# Patient Record
Sex: FEMALE | Race: WHITE | NOT HISPANIC OR LATINO | Employment: OTHER | URBAN - METROPOLITAN AREA
[De-identification: names, ages, dates, MRNs, and addresses within clinical notes are randomized per-mention and may not be internally consistent; named-entity substitution may affect disease eponyms.]

---

## 2018-07-27 ENCOUNTER — OFFICE VISIT (OUTPATIENT)
Dept: NEUROLOGY | Facility: CLINIC | Age: 68
End: 2018-07-27
Payer: MEDICARE

## 2018-07-27 VITALS
HEART RATE: 63 BPM | DIASTOLIC BLOOD PRESSURE: 82 MMHG | SYSTOLIC BLOOD PRESSURE: 141 MMHG | RESPIRATION RATE: 18 BRPM | WEIGHT: 136.5 LBS | HEIGHT: 63 IN | BODY MASS INDEX: 24.19 KG/M2

## 2018-07-27 DIAGNOSIS — G50.9 IDIOPATHIC TRIGEMINAL NEUROPATHY: Primary | ICD-10-CM

## 2018-07-27 PROBLEM — G50.1 ATYPICAL FACIAL PAIN: Status: ACTIVE | Noted: 2017-03-20

## 2018-07-27 PROBLEM — M47.817 LUMBOSACRAL SPONDYLOSIS WITHOUT MYELOPATHY: Status: ACTIVE | Noted: 2017-03-21

## 2018-07-27 PROBLEM — M79.644 PAIN OF RIGHT THUMB: Status: ACTIVE | Noted: 2018-06-19

## 2018-07-27 PROCEDURE — 99999 PR PBB SHADOW E&M-NEW PATIENT-LVL III: CPT | Mod: PBBFAC,,, | Performed by: PSYCHIATRY & NEUROLOGY

## 2018-07-27 PROCEDURE — 99204 OFFICE O/P NEW MOD 45 MIN: CPT | Mod: S$GLB,,, | Performed by: PSYCHIATRY & NEUROLOGY

## 2018-07-27 RX ORDER — HYDROCHLOROTHIAZIDE 12.5 MG/1
12.5 TABLET ORAL
COMMUNITY
Start: 2017-12-18 | End: 2018-12-18

## 2018-07-27 RX ORDER — GABAPENTIN 100 MG/1
CAPSULE ORAL
COMMUNITY
Start: 2018-07-11 | End: 2018-07-27

## 2018-07-27 RX ORDER — DICLOFENAC SODIUM 10 MG/G
2 GEL TOPICAL DAILY
COMMUNITY

## 2018-07-27 RX ORDER — PANTOPRAZOLE SODIUM 40 MG/1
40 TABLET, DELAYED RELEASE ORAL
COMMUNITY
Start: 2018-06-18

## 2018-07-27 RX ORDER — BACLOFEN 10 MG/1
10 TABLET ORAL 4 TIMES DAILY
COMMUNITY
Start: 2018-05-08 | End: 2019-05-08

## 2018-07-27 RX ORDER — ATORVASTATIN CALCIUM 10 MG/1
10 TABLET, FILM COATED ORAL
COMMUNITY
Start: 2018-06-18

## 2018-07-27 RX ORDER — FERROUS SULFATE 324(65)MG
325 TABLET, DELAYED RELEASE (ENTERIC COATED) ORAL EVERY OTHER DAY
COMMUNITY

## 2018-07-27 RX ORDER — HYDROCODONE BITARTRATE AND ACETAMINOPHEN 10; 325 MG/1; MG/1
TABLET ORAL
COMMUNITY

## 2018-07-27 RX ORDER — CHOLECALCIFEROL (VITAMIN D3) 125 MCG
CAPSULE ORAL
COMMUNITY

## 2018-07-27 RX ORDER — LANOLIN ALCOHOL/MO/W.PET/CERES
100 CREAM (GRAM) TOPICAL DAILY
COMMUNITY

## 2018-07-27 RX ORDER — LANOLIN ALCOHOL/MO/W.PET/CERES
400 CREAM (GRAM) TOPICAL DAILY
COMMUNITY

## 2018-07-27 RX ORDER — NORTRIPTYLINE HYDROCHLORIDE 75 MG/1
60 CAPSULE ORAL
COMMUNITY
Start: 2018-06-18 | End: 2019-06-18

## 2018-07-27 RX ORDER — FLUTICASONE PROPIONATE 50 MCG
1 SPRAY, SUSPENSION (ML) NASAL DAILY
COMMUNITY

## 2018-07-27 RX ORDER — RAMELTEON 8 MG/1
TABLET ORAL
COMMUNITY
Start: 2018-07-11 | End: 2019-04-11

## 2018-07-27 RX ORDER — GABAPENTIN 300 MG/1
300 CAPSULE ORAL 3 TIMES DAILY
Qty: 270 CAPSULE | Refills: 3 | Status: SHIPPED | OUTPATIENT
Start: 2018-07-27 | End: 2019-05-28 | Stop reason: SDUPTHER

## 2018-07-27 NOTE — PATIENT INSTRUCTIONS
Ask your doctor about your thyroid function to see if this is why you are getting hot sensations in your face and excessive sweating.      Will increase Gabapentin to 300mg three times a day for the facial numbness.  This can go higher if needed.  For now, increase as instructed below.    Week AM Midday PM   1 200 200 300   2 300 200 300   3 300 300 300     Common side effects are fatigue and lightheadedness.  These should pass after a few days.    Please contact the office with any questions.

## 2018-07-27 NOTE — PROGRESS NOTES
NEUROLOGY  Outpatient CONSULT    [facility]  [address], [place] [address]  [phone] (office) / [phone] (fax)    Patient Name:  Elda Cohen  :  1950  MR #:  [id number]  Acct #:  [id number]    Date of Neurology Consult: 2018  Name of Neurologist: Tasha Reilly D.O, ABPN, AOBNP, ABEM    Other Physicians:  Amanda Orantes MD (Primary Care Physician); Amanda Orantes MD (Referring)      Chief Complaint: Numbness (consult)      History of Present Illness (HPI):  Elda Cohen is a 67 y.o. female referred by her primary physician for consultation regarding right facial numbness.    Patient states that this comes and goes.  She has feels it from the ear to her jaw and down her neck.  Sometimes she will get a sharp pain behind her right ear.  She states it feels like a 1000 needles on her face.  She will rub it or tilt her head and pull it, but it will persist.  It is driving her crazy.  She feels like this has to be a sign of something.  She is grateful it is not continuous.  She has not had any trauma to that side of the face.  She has no issues with the ear.  She has never had ear surgery.  This started about a year ago.  It was sudden onset.  She states initially it was just the jaw line, but now she feels it in the ear and neck.  It has not extended up the face.        She cannot say that anything triggers the numbness.  There is nothing that makes it go away.  She rubs it and tries exercises, but it will usually just spontaneous go away.  It is not painful.  It does not affect her chewing, speaking or swallowing.    She was seen by the Dentist who did not see anything abnormal.     She was seen by Dr. Ang in [place].  She was put on Gabapentin.  She does not feel it is helping.  She is on 200mg TID.  She states she did not really know what it was supposed to do.  She does not feel it is suppressing these sensations.  She has not tried going  higher.    She states while sitting here in the office she feels like her whole face is getting hot and red.  She states it can't be a hot flash.    Treatment to date:    Gabapentin 200mg TID    Review of Systems:   General: Weight gain: No, Weight Loss: No, Fatigue: No,   Fever: No, Chills: No, Night Sweats: No, Insomnia: Yes, Excessive sleeping: No   Respiratory:  Cough: No, Shortness of Breath: No,   Wheezing: No, Excessive Snoring: Yes, Coughing up blood: No  Endocrine: Heat Intolerance: Yes, Cold Intolerance: No,   Excessive Thirst: No, Excessive Hunger: No,   Eyes:  Blurred Vision: No, Double Vision: No,   Light Sensitivity: No, Eye pain: Yes  Musculoskeletal: Muscle Aches/Pain: Yes, Joint Pain/Swelling: Yes, Muscle Cramps: No, Muscle Weakness: No, Neck Pain: Yes, Back Pain: Yes   Neurological: Difficulty Walking/Falls: Yes, Headache Migraine: No, Dizziness/Vertigo: No, Fainting: No, Difficulty with Speech: No, Weakness: No, Tingling/Numbness: Yes, Tremors: No, Memory Problems: Yes, Seizures: No, Difficulty Swallowing: No, Altered Taste: No.  Cardiovascular: Chest Pain: No, Shortness of Breath: No,   Palpitations: No,  Gastrointestinal: Nausea/Vomiting: No, Constipation: Yes, Diarrhea: No, Bloody Stools: No   Psych/Cog:  Depression: No, Anxiety: No, Hallucinations: No, Problems Concentrating: No  : Frequent Urination: No, Incontinence: No, Blood of Urine: No, Urinary Infections: No, Changes in Sex Drive: No   ENT:Hearing Loss: Yes, Earache: No, Ringing in Ears: No,   Facial Pain: Yes, Chronic Congestion: No   Immune: Seasonal Allergies: Yes, Hives and/or Rashes: No  The remainder of the review of twelve body systems was reviewed and normal.    Past Medical, Surgical, Family & Social History:   Past Medical History:   Diagnosis Date    Hypertension      Past Surgical History:   Procedure Laterality Date    GASTRIC BYPASS      knee replacemnet Right     NECK SURGERY      ROTATOR CUFF REPAIR       Family  "History   Problem Relation Age of Onset    Stroke Mother     Kidney disease Mother     Cancer Brother     Cancer Paternal Grandmother     Lung disease Brother      Alcohol use:  reports that she does not drink alcohol.   (Of note, 0.6 oz = 1 beer or 6 oz = 10 beers).  Tobacco use:  reports that she has never smoked. She has never used smokeless tobacco.  Street drug use:  reports that she does not use drugs.  Allergies: Patient has no known allergies..    Home Medications:     Current Outpatient Prescriptions:     atorvastatin (LIPITOR) 10 MG tablet, Take 10 mg by mouth., Disp: , Rfl:     baclofen (LIORESAL) 10 MG tablet, Take 10 mg by mouth., Disp: , Rfl:     gabapentin (NEURONTIN) 100 MG capsule, TAKE 2 CAPSULES BY MOUTH THREE TIMES DAILY, Disp: , Rfl:     hydroCHLOROthiazide (HYDRODIURIL) 12.5 MG Tab, Take 12.5 mg by mouth., Disp: , Rfl:     nortriptyline (PAMELOR) 75 MG Cap, Take 75 mg by mouth., Disp: , Rfl:     pantoprazole (PROTONIX) 40 MG tablet, Take 40 mg by mouth., Disp: , Rfl:     ramelteon (ROZEREM) 8 mg tablet, TAKE 1 TABLET BY MOUTH EVERY NIGHT AT BEDTIME AS NEEDED FOR SLEEP, Disp: , Rfl:     HYDROcodone-acetaminophen (NORCO)  mg per tablet, Norco, Disp: , Rfl:     Physical Examination:  BP (!) 141/82   Pulse 63   Resp 18   Ht 5' 3" (1.6 m)   Wt 61.9 kg (136 lb 8 oz)   BMI 24.18 kg/m²     GENERAL:  General appearance: Well, non-toxic appearing.  No apparent distress.  Fundi exam: normal.  Neck: supple.  Carotid auscultation: normal.  Heart auscultation: normal.  Peripheral pulses: normal.  Extremities: normal.    MENTAL STATUS:  Alertness, attention span & concentration: normal.  Language: normal.  Orientation to self, place & time:  normal.  Memory, recent & remote: normal.  Fund of knowledge: normal.    SPEECH:  Clear and fluent.  Follows complex commands.    CRANIAL NERVES:  Cranial Nerves II-XII were examined.  II - Visual fields: normal.  III, IV, VI: PERRL, EOMI, No " ptosis, No nystagmus.  V - Facial sensation: normal.  VII - Face symmetry & mobility: normal.  VIII - Hearing: normal.  IX, X - Palate: mobile & midline.  XI - Shoulder shrug: normal.  XII - Tongue protrusion: normal.    GROSS MOTOR:  Gait & station: normal.  Tone: normal.  Abnormal movements: none.  Finger-nose & Heel-knee-shin: normal.  Rapid alternating movements & drift: normal.  Romberg: absent    MUSCLE STRENGTH:     Fascics Atrophy RIGHT    LEFT Atrophy Fascics     5 Neck Ext. 5       5 Neck Flex 5       5 Deltoids 5       5 Sh.Ext.Rot. 5       5 Sh.Int.Rot. 5       5 Biceps 5       5 Triceps 5       5 Forearm.Pr. 5       5 Wrist Ext. 5       5 Wrist Flex 5       5 Finger Ext. 5       5 Finger Flex 5       5 FPL 5       5 Inteross. 5                         5 Iliopsoas 5       5 Hip Abduct 5       5 Hip Adduct 5       5 Quads 5       5 Hams 5       5 Dorsiflex 5       5 Plantar Flex 5       5 Ankle Dean 5       5 Ankle Invert 5       5 Toe Ext. 5       5 Toe Flex 5                         REFLEXES:    RIGHT Reflex   LEFT   2+ Biceps 2+   2+ Brachiorad. 2+   2+ Triceps 2+        2+ Patellar 2+   2+ Ankle 2+        Down PLANTAR Down     SENSORY:  Light touch: Normal throughout.  Sharp touch: Normal throughout.  Vibration: Normal throughout.    Diagnostic Data Reviewed:   Records were reviewed.  She was seen by her PCP in June complaining of right sided facial numbness and tingling.  She had been seen by neurologist her report she did not leak seen him.  She continued to have tingling in the right side of her face and so an MRI was performed which was normal. She was upset because she was told she would have to live with it.  Urine to 100 mg 3 times a day does not seem to be helping.  Her sensations continued occur multiple times per day.  She is on Pamelor but for other reasons.      Her images were reviewed.  There was no abnormality along the trigeminal nerve pathway such as vascular compression or mass  lesion.  See report below.    MRI ORBIT, FACE AND NECK WITH AND WITHOUT CONTRAST    HISTORY: Tingling right side of neck, face, ear, jaw.    TECHNIQUE: On the 1.5 Julia Siemens magnet, sagittal T1 VIKTOR, axial T1 VIKTOR, axial T2 VIKTOR, cor T1 VIKTOR, cor T1 FS, cor T2 FS, axial T1 VIBE, +c axial T1 VIBE, +c cor T1 FS, +c sagittal T1 FS were obtained.  15 cc MultiHance.    FINDINGS:  The portions of the brain seen appear unremarkable. The naso and oropharyngeal regions are normal. The portions of the sinuses seen appear normal. The parapharyngeal spaces and  spaces are normal. The oral cavity is grossly   unremarkable. The hypopharynx, glottic and subglottic regions are normal. The carotid and parotid spaces are normal. The infrahyoid neck is unremarkable.    The region of Meckel's cave shows no significant abnormality. I am unable to demonstrate an abnormality in the region of the trigeminal nerve or its branches. No areas of abnormal enhancement are seen after contrast material.    IMPRESSION:   No significant findings are demonstrated on this exam. Specifically, the trigeminal nerves and visualized branches appear normal.    Preliminary by Anam Robison MD. 3/30/2017 2:59 PM    Finalized by Anam Robison MD. 3/30/2017 4:15 PM   Other Result Information   Devyn, External Ris In - 03/30/2017  4:18 PM CDT  MRI ORBIT, FACE AND NECK WITH AND WITHOUT CONTRAST    HISTORY: Tingling right side of neck, face, ear, jaw.    TECHNIQUE: On the 1.5 Julia Siemens magnet, sagittal T1 VIKTOR, axial T1 VIKTOR, axial T2 VIKTOR, cor T1 VIKTOR, cor T1 FS, cor T2 FS, axial T1 VIBE, +c axial T1 VIBE, +c cor T1 FS, +c sagittal T1 FS were obtained.  15 cc MultiHance.    FINDINGS:  The portions of the brain seen appear unremarkable. The naso and oropharyngeal regions are normal. The portions of the sinuses seen appear normal. The parapharyngeal spaces and  spaces are normal. The oral cavity is grossly   unremarkable. The hypopharynx,  glottic and subglottic regions are normal. The carotid and parotid spaces are normal. The infrahyoid neck is unremarkable.    The region of Meckel's cave shows no significant abnormality. I am unable to demonstrate an abnormality in the region of the trigeminal nerve or its branches. No areas of abnormal enhancement are seen after contrast material.    IMPRESSION:   No significant findings are demonstrated on this exam. Specifically, the trigeminal nerves and visualized branches appear normal.       Her CMP from 6/18/18 was normal.    Assessment and Plan:  Elda Cohen is a 67 y.o., right handed woman who presents with complaints of right facial dysesthesias that have been persistent despite initiation of Gabapentin.  Her description is most consistent with trigeminal neuralgia.  We reviewed that there is no cure for trigeminal neuralgia and that the cause is poorly understood.  In patients with normal objective sensation, there is little need for imaging as this is not due to a compression or infarction of the nerve.  She had imaging which confirms there is no disruption of the trigeminal pathway. We reviewed that the mainstay of care is pain management.  We reviewed that there are several medication options including Tegretol, Trileptal, Gabapentin, Lyrica, and Amitriptyline.  We reviewed that she is on a very low dose of Gabapentin for someone with trigeminal neuralgia.  We reviewed the potential side effects of all of these medications.  She has agreed to try increasing the dose of Gabapentin as needed and as tolerated.  She will start with a transition to 300mg TID.  From there she will report in how she is doing and her prescription can be changed to account for higher dosing if needed.  From there she can follow up with her family physician unless further assistance is needed in the future.    Important to note, also  has a past medical history of Hypertension.        Tasha Reilly D.O, ABPN, AOBNP,  ABEM    This note was created with voice recognition software.  Grammatical, syntax and spelling errors may be inevitable.

## 2018-07-27 NOTE — LETTER
August 22, 2018      Amanda Orantes MD  [address] [address]  [address]  [facility] [place] [address]           [facility]  [address] [facility] [address] [place] [address]  Phone: [phone]  Fax: [phone]          Patient: Elda Cohen   MR Number: [id number]   YOB: 1950   Date of Visit: 7/27/2018       Dear Dr. Amanda Orantes:    Thank you for referring Elda Cohen to me for evaluation. Attached you will find relevant portions of my assessment and plan of care.    If you have questions, please do not hesitate to call me. I look forward to following Elda Cohen along with you.    Sincerely,    Tasha Reilly,     Enclosure  CC:  No Recipients    If you would like to receive this communication electronically, please contact [email or web address] or [phone] to request more information on [employer] Link access.    For providers and/or their staff who would like to refer a patient to [facility], please contact us through our one-stop-shop provider referral line, [facility] , at [phone].    If you feel you have received this communication in error or would no longer like to receive these types of communications, please e-mail [email or web address]

## 2019-04-11 ENCOUNTER — OFFICE VISIT (OUTPATIENT)
Dept: NEUROLOGY | Facility: CLINIC | Age: 69
End: 2019-04-11
Payer: MEDICARE

## 2019-04-11 VITALS
RESPIRATION RATE: 18 BRPM | DIASTOLIC BLOOD PRESSURE: 71 MMHG | SYSTOLIC BLOOD PRESSURE: 156 MMHG | HEART RATE: 88 BPM | WEIGHT: 139.38 LBS | BODY MASS INDEX: 24.7 KG/M2 | HEIGHT: 63 IN

## 2019-04-11 DIAGNOSIS — G50.0 TRIGEMINAL NEURALGIA: ICD-10-CM

## 2019-04-11 DIAGNOSIS — M47.22 CERVICAL RADICULOPATHY DUE TO DEGENERATIVE JOINT DISEASE OF SPINE: Primary | ICD-10-CM

## 2019-04-11 DIAGNOSIS — M54.16 LUMBAR RADICULOPATHY: ICD-10-CM

## 2019-04-11 PROCEDURE — 99215 OFFICE O/P EST HI 40 MIN: CPT | Mod: S$GLB,,, | Performed by: PSYCHIATRY & NEUROLOGY

## 2019-04-11 PROCEDURE — 1101F PR PT FALLS ASSESS DOC 0-1 FALLS W/OUT INJ PAST YR: ICD-10-PCS | Mod: CPTII,S$GLB,, | Performed by: PSYCHIATRY & NEUROLOGY

## 2019-04-11 PROCEDURE — 99999 PR PBB SHADOW E&M-EST. PATIENT-LVL IV: CPT | Mod: PBBFAC,,, | Performed by: PSYCHIATRY & NEUROLOGY

## 2019-04-11 PROCEDURE — 99215 PR OFFICE/OUTPT VISIT, EST, LEVL V, 40-54 MIN: ICD-10-PCS | Mod: S$GLB,,, | Performed by: PSYCHIATRY & NEUROLOGY

## 2019-04-11 PROCEDURE — 1101F PT FALLS ASSESS-DOCD LE1/YR: CPT | Mod: CPTII,S$GLB,, | Performed by: PSYCHIATRY & NEUROLOGY

## 2019-04-11 PROCEDURE — 99999 PR PBB SHADOW E&M-EST. PATIENT-LVL IV: ICD-10-PCS | Mod: PBBFAC,,, | Performed by: PSYCHIATRY & NEUROLOGY

## 2019-04-11 RX ORDER — VIT C/E/ZN/COPPR/LUTEIN/ZEAXAN 250MG-90MG
CAPSULE ORAL
COMMUNITY
Start: 2019-03-04

## 2019-04-11 RX ORDER — HYDROCHLOROTHIAZIDE 12.5 MG/1
12.5 TABLET ORAL
COMMUNITY
Start: 2018-12-18 | End: 2019-12-18

## 2019-04-11 NOTE — PATIENT INSTRUCTIONS
Will get an updated MRI of the cervical and lumbar spine.    A referral has been placed to the back and spine clinic for consultation regarding your radicular symptoms.

## 2019-04-11 NOTE — PROGRESS NOTES
NEUROLOGY  Outpatient Follow Up    [facility]  [address], [place] [address]  [phone] (office) / [phone] (fax)    Patient Name:  Elda Cohen  :  1950  MR #:  [id number]  Acct #:  [id number]    Date of Neurology Visit: 2019  Name of Neurologist: Tasha Reilly D.O, ABPN, AOBNP, ABEM    Other Physicians:  Amanda Orantes MD (Primary Care Physician); No ref. provider found (Referring)      Chief Complaint: Numbness (numbness/tingling to right occipital area radiating into scalp and right face. )      History of Present Illness (HPI):  Elda Cohen is a 68 y.o. femalewho states she is here for a new problem of numbness and tingling on the back of her neck and head.  She states she had these problems that last visit, but was focused on the trigeminal issue then.  She is here today because she wants to transition her neurological care from [facility] to [facility].    Patient states she has pinched nerves from C7-T1.  She has been given options of surgery versus medication management.  She has been getting a numbness and tingling sensation on the back of her neck, up the back or her head and to her ear.  She states that if she leans her head in the opposite direction it relieves some of those symptoms.      She changed her Gabapentin last year 300mg TID for trigeminal neuralgia.  This may have helped some, but it she still has painful episodes.  She is also on nortriptyline for her cervical spine disease.    She has had C3-4 surgery 25 years ago.  This was for numbness and weakness of her right arm.  She used to have to hold her right arm up and over her head to relieve the symptoms.  Surgery resolved these issues for years until about 2-3 years ago when she started getting this current numbness on the neck and head.    She also has lumbar spine disease.  She has had lumbar spine surgery on the right which did relieve her sciatic symptoms, but she still has  back pain.  This pain is mainly on the left side, and it does extend into her buttock.  She no longer has sciatic pain with it.  She tries not to limit her activities, but staying in certain positions will make it worse.  She tries to go about her day whether in pain or not.    She has had epidural injections in the past, she did not find them to be helpful for her back.  She states years ago the injections in her neck were helpful, but she still needed oral medication for this.    She has been ambulating hitched forward at the waist for relief of her back and buttock and leg pain.  She can't walk as far when she straightens up.  She wondered if she needed a back brace.      She has back and neck traction at home, but has not been using it lately due to concerns over her new symptoms.    She has a history of scoliosis of the spine as well.    Treatment to date:    Nortriptyline  Gabapentin    Review of Systems:    General: Weight gain: No, Weight Loss: No, Fatigue: No,   Fever: No, Chills: No, Night Sweats: No, Insomnia: No, Excessive sleeping: No   Respiratory:  Cough: No, Shortness of Breath: No,   Wheezing: No, Excessive Snoring: Yes, Coughing up blood: No  Endocrine: Heat Intolerance: Yes, Cold Intolerance: No,   Excessive Thirst: No, Excessive Hunger: No,   Eyes:  Blurred Vision: No, Double Vision: No,   Light Sensitivity: No, Eye pain: No  Musculoskeletal: Muscle Aches/Pain: Yes, Joint Pain/Swelling: Yes, Muscle Cramps: Yes, Muscle Weakness: Yes, Neck Pain: Yes, Back Pain: Yes   Neurological: Difficulty Walking/Falls: No, Headache Migraine: No, Dizziness/Vertigo: No, Fainting: No, Difficulty with Speech: No, Weakness: No, Tingling/Numbness: Yes, Tremors: No, Memory Problems: Yes, Seizures: No, Difficulty Swallowing: No, Altered Taste: No.  Cardiovascular: Chest Pain: No, Shortness of Breath: No,   Palpitations: No,  Gastrointestinal: Nausea/Vomiting: No, Constipation: Yes, Diarrhea: No, Bloody Stools: No    Psych/Cog:  Depression: No, Anxiety: No, Hallucinations: No, Problems Concentrating: No  : Frequent Urination: No, Incontinence: No, Blood of Urine: No, Urinary Infections: No, Changes in Sex Drive: No   ENT:Hearing Loss: Yes, Earache: No, Ringing in Ears: Yes,   Facial Pain: Yes, Chronic Congestion: No   Immune: Seasonal Allergies: Yes, Hives and/or Rashes: No  The remainder of the review of twelve body systems was reviewed and normal.    Past Medical, Surgical, Family & Social History:   Reviewed and updated.    Home Medications:     Current Outpatient Medications:     ascorbic acid, vitamin C, Powd, Take by mouth., Disp: , Rfl:     atorvastatin (LIPITOR) 10 MG tablet, Take 10 mg by mouth., Disp: , Rfl:     baclofen (LIORESAL) 10 MG tablet, Take 10 mg by mouth 4 (four) times daily. , Disp: , Rfl:     biotin 5,000 mcg TbDL, Take by mouth., Disp: , Rfl:     cholecalciferol, vitamin D3, (VITAMIN D3) 1,000 unit capsule, TAKE 1 CAPSULE BY MOUTH DAILY, Disp: , Rfl:     cyanocobalamin (VITAMIN B-12) 1000 MCG tablet, Take 100 mcg by mouth once daily., Disp: , Rfl:     diclofenac sodium 1 % Gel, Apply 2 g topically once daily., Disp: , Rfl:     ferrous sulfate 324 mg (65 mg iron) TbEC, Take 325 mg by mouth every other day. , Disp: , Rfl:     fluticasone (FLONASE) 50 mcg/actuation nasal spray, 1 spray by Each Nare route once daily., Disp: , Rfl:     gabapentin (NEURONTIN) 300 MG capsule, Take 1 capsule (300 mg total) by mouth 3 (three) times daily., Disp: 270 capsule, Rfl: 3    hydroCHLOROthiazide (HYDRODIURIL) 12.5 MG Tab, Take 12.5 mg by mouth., Disp: , Rfl:     HYDROcodone-acetaminophen (NORCO)  mg per tablet, Norco, Disp: , Rfl:     loratadine-pseudoephedrine  mg (CLARITIN-D 24 HOUR)  mg per 24 hr tablet, Take 1 tablet by mouth., Disp: , Rfl:     magnesium oxide (MAG-OX) 400 mg tablet, Take 400 mg by mouth once daily., Disp: , Rfl:     naloxegol (MOVANTIK) tablet, Take 25 mg by mouth  "3 (three) times a week., Disp: , Rfl:     nortriptyline (PAMELOR) 75 MG Cap, Take 60 mg by mouth. , Disp: , Rfl:     pantoprazole (PROTONIX) 40 MG tablet, Take 40 mg by mouth., Disp: , Rfl:     vitamins  A,C,E-zinc-copper (ICAPS AREDS) 14,320-226-200 unit-mg-unit Cap, Take by mouth. 2 capsules daily, Disp: , Rfl:     Physical Examination:  BP (!) 156/71   Pulse 88   Resp 18   Ht 5' 3" (1.6 m)   Wt 63.2 kg (139 lb 6.4 oz)   BMI 24.69 kg/m²     GENERAL:  General appearance: Well, non-toxic appearing.  No apparent distress.  Extremities: normal.    MENTAL STATUS:  Alertness, attention span & concentration: normal.  Language: normal.  Orientation to self, place & time:  normal.  Memory, recent & remote: normal.  Fund of knowledge: normal.     SPEECH:  Clear and fluent.  Follows complex commands.     CRANIAL NERVES:  Cranial Nerves II-XII were examined.  II - Visual fields: normal.  III, IV, VI: PERRL, EOMI, No ptosis, No nystagmus.  V - Facial sensation: normal.  VII - Face symmetry & mobility: normal.  VIII - Hearing: normal.  IX, X - Palate: mobile & midline.  XI - Shoulder shrug: normal.  XII - Tongue protrusion: normal.     GROSS MOTOR:  Gait & station: normal.  Tone: normal.  Abnormal movements: none.  Finger-nose & Heel-knee-shin: normal.  Rapid alternating movements & drift: normal.  Romberg: absent     MUSCLE STRENGTH:      Fascics Atrophy RIGHT      LEFT Atrophy Fascics       5 Neck Ext. 5           5 Neck Flex 5           5 Deltoids 5           5 Sh.Ext.Rot. 5           5 Sh.Int.Rot. 5           5 Biceps 5           5 Triceps 5           5 Forearm.Pr. 5           5 Wrist Ext. 5           5 Wrist Flex 5           5 Finger Ext. 5           5 Finger Flex 5           5 FPL 5           5 Inteross. 5                                           5 Iliopsoas 5           5 Hip Abduct 5           5 Hip Adduct 5           5 Quads 5           5 Hams 5           5 Dorsiflex 5           5 Plantar Flex 5           5 " Ankle Dean 5           5 Ankle Invert 5           5 Toe Ext. 5           5 Toe Flex 5                                          REFLEXES:     RIGHT Reflex    LEFT   2 Biceps 2   2 Brachiorad. 2   2 Triceps 2         2 Patellar 2   2 Ankle 2           Down PLANTAR Down      SENSORY:  Light touch: Normal throughout.  Sharp touch: Normal throughout.     Diagnostic Data Reviewed:   Records were reviewed.  She was seen by her PCP in June complaining of right sided facial numbness and tingling.  She had been seen by neurologist her report she did not leak seen him.  She continued to have tingling in the right side of her face and so an MRI was performed which was normal. She was upset because she was told she would have to live with it.  Urine to 100 mg 3 times a day does not seem to be helping.  Her sensations continued occur multiple times per day.  She is on Pamelor but for other reasons.       Her images were reviewed.  There was no abnormality along the trigeminal nerve pathway such as vascular compression or mass lesion.  See report below.       MRI ORBIT, FACE AND NECK WITH AND WITHOUT CONTRAST    HISTORY: Tingling right side of neck, face, ear, jaw.    TECHNIQUE: On the 1.5 Julia Siemens magnet, sagittal T1 VIKTOR, axial T1 VIKTOR, axial T2 VIKTOR, cor T1 VIKTOR, cor T1 FS, cor T2 FS, axial T1 VIBE, +c axial T1 VIBE, +c cor T1 FS, +c sagittal T1 FS were obtained.  15 cc MultiHance.    FINDINGS:  The portions of the brain seen appear unremarkable. The naso and oropharyngeal regions are normal. The portions of the sinuses seen appear normal. The parapharyngeal spaces and  spaces are normal. The oral cavity is grossly   unremarkable. The hypopharynx, glottic and subglottic regions are normal. The carotid and parotid spaces are normal. The infrahyoid neck is unremarkable.    The region of Meckel's cave shows no significant abnormality. I am unable to demonstrate an abnormality in the region of the trigeminal nerve or  its branches. No areas of abnormal enhancement are seen after contrast material.    IMPRESSION:  No significant findings are demonstrated on this exam. Specifically, the trigeminal nerves and visualized branches appear normal.    Preliminary by Anam Robison MD. 3/30/2017 2:59 PM    Finalized by Anam Robison MD. 3/30/2017 4:15 PM   Other Result Information   Devyn, External Ris In - 03/30/2017  4:18 PM CDT  MRI ORBIT, FACE AND NECK WITH AND WITHOUT CONTRAST    HISTORY: Tingling right side of neck, face, ear, jaw.    TECHNIQUE: On the 1.5 Julia Siemens magnet, sagittal T1 VIKTOR, axial T1 VIKTOR, axial T2 VIKTOR, cor T1 VIKTOR, cor T1 FS, cor T2 FS, axial T1 VIBE, +c axial T1 VIBE, +c cor T1 FS, +c sagittal T1 FS were obtained.  15 cc MultiHance.    FINDINGS:  The portions of the brain seen appear unremarkable. The naso and oropharyngeal regions are normal. The portions of the sinuses seen appear normal. The parapharyngeal spaces and  spaces are normal. The oral cavity is grossly   unremarkable. The hypopharynx, glottic and subglottic regions are normal. The carotid and parotid spaces are normal. The infrahyoid neck is unremarkable.    The region of Meckel's cave shows no significant abnormality. I am unable to demonstrate an abnormality in the region of the trigeminal nerve or its branches. No areas of abnormal enhancement are seen after contrast material.    IMPRESSION:   No significant findings are demonstrated on this exam. Specifically, the trigeminal nerves and visualized branches appear normal.         Her CMP from 6/18/18 was normal.  Her TSH from Dec 2018 was normal.      Lumbar spine 5 view with previous study 2016 from [place]  ASSESSMENT: Again noted is the scoliosis. Osteopenia. Multilevel degenerative change throughout with disc space narrowing and marginal osteophytes with particular discogenic endplate reactive changes at L1-L2. No fracture identified. Extensive facet    osteoarthropathy.  IMPRESSION:   Progressive degenerative changes from 2016 and scoliosis.      MRI scan lumbar spine without contrast at [facility]  66-year-old patient with history of acute onset of left-sided back pain, sciatica for 4 weeks.  On the 1.5 Julia Siemens magnet, sagittal T2, sagittal T1, axial T2, axial T1, sagittal STIR were obtained.  Advanced Modic endplate changes at L1-2.  Mild anterolisthesis at L3-4, L3 anterior to L4 x 2 mm.  Schmorl's formation endplate disease at T11-12, worse anteriorly.  Conus medullaris terminates at L1 vertebral body level.  Rotoscoliosis, superiorly convexity to the right, inferiorly to the left.  T12-L1, left facet and ligamentum flavum hypertrophy, posterior mild leftward disc bulge and anterior leftward disc protrusion, osteophyte projection.  L1-2 asymmetric, left greater than right, facet and ligamentum flavum hypertrophy, left lateral osteophytic projection, rightward disc protrusion, compression of the left exiting and potentially the descending nerve root, mild focal spinal stenosis.  L2-3, moderate facet and ligamentum flavum hypertrophy, greater on the left effacement of the posterior lateral thecal sac, lateral disc bulge, greater to the right, no nerve root compression.  L3-4, posterior broad-based rightward disc bulge, bilateral facet hypertrophy, partial laminectomy and facetectomy, spinectomy, potential impingement of the right exiting nerve root, mild focal spinal stenosis.  L4-5, asymmetric, right greater than left facet and ligamentum flavum hypertrophy, posterior broad-based rightward disc bulge, potential for impingement of the right descending and exiting nerve roots, mild focal spinal stenosis.  L5-S1, posterior rightward disc bulge, facet hypertrophy, contact with the right exiting nerve root, potential for impingement, no compression.  Retroperitoneum, partially and suboptimally visualized, no obvious structural  lesions.  IMPRESSION:  MRI scan lumbar spine without contrast demonstrating  Rotoscoliosis with associated spondylosis and associated disc and endplate disease, worse at L1-2, as detailed above.  Postsurgical changes at L3-4, as detailed above.  No previous MRI available for comparison.      Assessment and Plan:  Elda Cohen is a 67 y.o., right handed woman seen last year for complaints of right facial dysesthesias that have been persistent despite initiation of low dose Gabapentin.  Her description was most consistent with trigeminal neuralgia. Increasing Gabapentin helped the frequency but not the pain.  Today she is worried about other issues. She has an extensive history of cervical and lumbar spine degenerative joint disease with radicular symptoms.  She has had surgery as well as epidural injections.  Surgery was beneficial for her for years, but more recently she has been experiencing new symptoms.  She has left low back and buttock pain which could be from S1 radicular disease.  She has right posterior neck, head and ear paresthesias frequently seen with C2/3 radiculopathy.    Will get updated MRIs of her cervical and lumbar spine.  Will refer to the back and spine clinic for assistance.  Will request discs of imaging from [facility] and from [facility] ([facility]) from the last 10 years including reports for the cervical, thoracic and lumbar spine.  Please also include any operative reports of the cervical and lumbar spine.    She will continue to avoid using her traction devices without updated advisement from the back and spine clinic.  She can continue her current medications for now, though the clinic may alter them somewhat to suit the needs of her new spinal issues.      Important to note, also  has a past medical history of Chronic pain, Hyperlipidemia, Hypertension, and Neuropathy.      This note was created with voice recognition software.  Grammatical, syntax and spelling  errors may be inevitable.        Tasha Reilly D.O, ABPN, AOBNP, ABEM

## 2019-04-22 ENCOUNTER — TELEPHONE (OUTPATIENT)
Dept: NEUROLOGY | Facility: CLINIC | Age: 69
End: 2019-04-22

## 2019-04-22 NOTE — TELEPHONE ENCOUNTER
----- Message from Jesus Manuel Walters sent at 4/22/2019  2:43 PM CDT -----  Type: Needs Medical Advice    Who Called:  Patient  Best Call Back Number: [phone] (home)   Additional Information: Right hip pain

## 2019-05-03 ENCOUNTER — TELEPHONE (OUTPATIENT)
Dept: NEUROLOGY | Facility: CLINIC | Age: 69
End: 2019-05-03

## 2019-05-03 NOTE — TELEPHONE ENCOUNTER
----- Message from Baldemar Carmen sent at 5/3/2019  8:49 AM CDT -----  Type: Needs Medical Advice    Who Called: Patient      Best Call Back Number: [phone]  Additional Information: Patient states that she would like a callback regarding where she should mail her MRI results.

## 2019-05-13 ENCOUNTER — HOSPITAL ENCOUNTER (OUTPATIENT)
Dept: RADIOLOGY | Facility: HOSPITAL | Age: 69
Discharge: HOME | End: 2019-05-13
Attending: PSYCHIATRY & NEUROLOGY
Payer: MEDICARE

## 2019-05-13 DIAGNOSIS — M54.16 LUMBAR RADICULOPATHY: ICD-10-CM

## 2019-05-13 DIAGNOSIS — M47.22 CERVICAL RADICULOPATHY DUE TO DEGENERATIVE JOINT DISEASE OF SPINE: ICD-10-CM

## 2019-05-13 PROCEDURE — 72148 MRI LUMBAR SPINE W/O DYE: CPT | Mod: TC,PO

## 2019-05-13 PROCEDURE — 72141 MRI NECK SPINE W/O DYE: CPT | Mod: 26,,, | Performed by: RADIOLOGY

## 2019-05-13 PROCEDURE — 72148 MRI LUMBAR SPINE W/O DYE: CPT | Mod: 26,,, | Performed by: RADIOLOGY

## 2019-05-13 PROCEDURE — 72141 MRI NECK SPINE W/O DYE: CPT | Mod: TC,PO

## 2019-05-13 PROCEDURE — 72141 MRI CERVICAL SPINE WITHOUT CONTRAST: ICD-10-PCS | Mod: 26,,, | Performed by: RADIOLOGY

## 2019-05-13 PROCEDURE — 72148 MRI LUMBAR SPINE WITHOUT CONTRAST: ICD-10-PCS | Mod: 26,,, | Performed by: RADIOLOGY

## 2019-05-15 ENCOUNTER — OFFICE VISIT (OUTPATIENT)
Dept: SPINE | Facility: CLINIC | Age: 69
End: 2019-05-15
Payer: MEDICARE

## 2019-05-15 VITALS
HEART RATE: 63 BPM | SYSTOLIC BLOOD PRESSURE: 145 MMHG | DIASTOLIC BLOOD PRESSURE: 80 MMHG | WEIGHT: 136.69 LBS | HEIGHT: 63 IN | BODY MASS INDEX: 24.22 KG/M2

## 2019-05-15 DIAGNOSIS — G89.29 CHRONIC BILATERAL LOW BACK PAIN WITH BILATERAL SCIATICA: ICD-10-CM

## 2019-05-15 DIAGNOSIS — M54.42 CHRONIC BILATERAL LOW BACK PAIN WITH BILATERAL SCIATICA: ICD-10-CM

## 2019-05-15 DIAGNOSIS — M54.2 NECK PAIN: Primary | ICD-10-CM

## 2019-05-15 DIAGNOSIS — M47.22 OSTEOARTHRITIS OF SPINE WITH RADICULOPATHY, CERVICAL REGION: ICD-10-CM

## 2019-05-15 DIAGNOSIS — M54.41 CHRONIC BILATERAL LOW BACK PAIN WITH BILATERAL SCIATICA: ICD-10-CM

## 2019-05-15 DIAGNOSIS — M47.816 LUMBAR SPONDYLOSIS: ICD-10-CM

## 2019-05-15 DIAGNOSIS — M41.9 SCOLIOSIS, UNSPECIFIED SCOLIOSIS TYPE, UNSPECIFIED SPINAL REGION: ICD-10-CM

## 2019-05-15 PROCEDURE — 99204 OFFICE O/P NEW MOD 45 MIN: CPT | Mod: S$GLB,,, | Performed by: PHYSICIAN ASSISTANT

## 2019-05-15 PROCEDURE — 99204 PR OFFICE/OUTPT VISIT, NEW, LEVL IV, 45-59 MIN: ICD-10-PCS | Mod: S$GLB,,, | Performed by: PHYSICIAN ASSISTANT

## 2019-05-15 PROCEDURE — 1101F PT FALLS ASSESS-DOCD LE1/YR: CPT | Mod: CPTII,S$GLB,, | Performed by: PHYSICIAN ASSISTANT

## 2019-05-15 PROCEDURE — 99999 PR PBB SHADOW E&M-EST. PATIENT-LVL IV: CPT | Mod: PBBFAC,,, | Performed by: PHYSICIAN ASSISTANT

## 2019-05-15 PROCEDURE — 99999 PR PBB SHADOW E&M-EST. PATIENT-LVL IV: ICD-10-PCS | Mod: PBBFAC,,, | Performed by: PHYSICIAN ASSISTANT

## 2019-05-15 PROCEDURE — 1101F PR PT FALLS ASSESS DOC 0-1 FALLS W/OUT INJ PAST YR: ICD-10-PCS | Mod: CPTII,S$GLB,, | Performed by: PHYSICIAN ASSISTANT

## 2019-05-15 NOTE — PATIENT INSTRUCTIONS
You have arthritis in your neck and lower back.  You also have scoliosis in the lower back.      The arthritis places pressure on the area around the spinal cord and the nerve in your neck that go to your arms to contribute to neck pain and arm pain.  You do not have signs of spinal cord buising or irritation.  I recommend exercise, therapy and discussing further injections/ nerve burning procedures with pain management.  Surgery is not urgent, but if pain ever became too great, it could be performed.    The arthritis and scoliosis in your lower back contribute to back and leg pain.   I recommend exercise, therapy and discussing further injections/ nerve burning procedures with pain management.  Surgery is not urgent, but if pain ever became too great, it could be performed.

## 2019-05-15 NOTE — LETTER
May 29, 2019      Tasha Reilly, DO  [address] [facility]  [place] [address]           [facility]  [address] [facility]  [place] [address]  Phone: [phone]  Fax: [phone]          Patient: Elda Cohen   MR Number: [id number]   YOB: 1950   Date of Visit: 5/15/2019       Dear Dr. Tasha Reilly:    Thank you for referring Elda Cohen to me for evaluation. Attached you will find relevant portions of my assessment and plan of care.    If you have questions, please do not hesitate to call me. I look forward to following Elda Cohen along with you.    Sincerely,    Willow Allen PA-C    Enclosure  CC:  No Recipients    If you would like to receive this communication electronically, please contact [email or web address] or [phone] to request more information on [employer] access.    For providers and/or their staff who would like to refer a patient to [facility], please contact us through our one-stop-shop provider referral line, [facility] [place], at [phone].    If you feel you have received this communication in error or would no longer like to receive these types of communications, please e-mail [email or web address]

## 2019-05-28 DIAGNOSIS — G50.9 IDIOPATHIC TRIGEMINAL NEUROPATHY: ICD-10-CM

## 2019-05-30 RX ORDER — GABAPENTIN 300 MG/1
CAPSULE ORAL
Qty: 270 CAPSULE | Refills: 0 | Status: SHIPPED | OUTPATIENT
Start: 2019-05-30 | End: 2019-11-11 | Stop reason: SDUPTHER

## 2019-05-30 NOTE — PROGRESS NOTES
Neurosurgery History & Physical    Patient ID: Elda Cohen is a 68 y.o. female.    Chief Complaint   Patient presents with    Neck Pain     She has had neck pain for years.Pain is constant. Pain radiates down right shoulder into right arm and hand and down left shoulder.She does physical therapy at home. Certain positions makes pain worse.    Lumbar Spine Pain (L-Spine)     She has had low back pain for 2 years. Pain is constant. Pain radiates down both legs, rt to thigh, and left to toes.       Review of Systems   Constitutional: Negative for activity change, appetite change, chills, fever and unexpected weight change.   HENT: Negative for tinnitus, trouble swallowing and voice change.    Respiratory: Negative for apnea, cough, chest tightness and shortness of breath.    Cardiovascular: Negative for chest pain and palpitations.   Gastrointestinal: Negative for constipation, diarrhea, nausea and vomiting.   Genitourinary: Negative for difficulty urinating, dysuria, frequency and urgency.   Musculoskeletal: Positive for back pain, myalgias and neck pain. Negative for gait problem and neck stiffness.   Skin: Negative for wound.   Neurological: Negative for dizziness, tremors, seizures, facial asymmetry, speech difficulty, weakness, light-headedness, numbness and headaches.   Psychiatric/Behavioral: Negative for confusion and decreased concentration.       Past Medical History:   Diagnosis Date    Chronic pain     Hyperlipidemia     Hypertension     Neuropathy      Social History     Socioeconomic History    Marital status:      Spouse name: Not on file    Number of children: Not on file    Years of education: Not on file    Highest education level: Not on file   Occupational History    Not on file   Social Needs    Financial resource strain: Not on file    Food insecurity:     Worry: Not on file     Inability: Not on file    Transportation needs:     Medical: Not on file     Non-medical: Not  on file   Tobacco Use    Smoking status: Never Smoker    Smokeless tobacco: Never Used   Substance and Sexual Activity    Alcohol use: No    Drug use: No    Sexual activity: Not on file   Lifestyle    Physical activity:     Days per week: Not on file     Minutes per session: Not on file    Stress: Not on file   Relationships    Social connections:     Talks on phone: Not on file     Gets together: Not on file     Attends Bahai service: Not on file     Active member of club or organization: Not on file     Attends meetings of clubs or organizations: Not on file     Relationship status: Not on file   Other Topics Concern    Not on file   Social History Narrative    Not on file     Family History   Problem Relation Age of Onset    Stroke Mother     Kidney disease Mother     Cancer Brother     Cancer Paternal Grandmother     Lung disease Brother      Review of patient's allergies indicates:  No Known Allergies    Current Outpatient Medications:     ascorbic acid, vitamin C, Powd, Take by mouth., Disp: , Rfl:     atorvastatin (LIPITOR) 10 MG tablet, Take 10 mg by mouth., Disp: , Rfl:     biotin 5,000 mcg TbDL, Take by mouth., Disp: , Rfl:     cholecalciferol, vitamin D3, (VITAMIN D3) 1,000 unit capsule, TAKE 1 CAPSULE BY MOUTH DAILY, Disp: , Rfl:     cyanocobalamin (VITAMIN B-12) 1000 MCG tablet, Take 100 mcg by mouth once daily., Disp: , Rfl:     diclofenac sodium 1 % Gel, Apply 2 g topically once daily., Disp: , Rfl:     ferrous sulfate 324 mg (65 mg iron) TbEC, Take 325 mg by mouth every other day. , Disp: , Rfl:     fluticasone (FLONASE) 50 mcg/actuation nasal spray, 1 spray by Each Nare route once daily., Disp: , Rfl:     gabapentin (NEURONTIN) 300 MG capsule, Take 1 capsule (300 mg total) by mouth 3 (three) times daily., Disp: 270 capsule, Rfl: 3    hydroCHLOROthiazide (HYDRODIURIL) 12.5 MG Tab, Take 12.5 mg by mouth as needed. , Disp: , Rfl:     HYDROcodone-acetaminophen (NORCO)  " mg per tablet, Norco, Disp: , Rfl:     loratadine-pseudoephedrine  mg (CLARITIN-D 24 HOUR)  mg per 24 hr tablet, Take 1 tablet by mouth., Disp: , Rfl:     magnesium oxide (MAG-OX) 400 mg tablet, Take 400 mg by mouth once daily., Disp: , Rfl:     multivitamin capsule, Take 1 capsule by mouth once daily., Disp: , Rfl:     naloxegol (MOVANTIK) tablet, Take 25 mg by mouth once daily. , Disp: , Rfl:     nortriptyline (PAMELOR) 75 MG Cap, Take 60 mg by mouth. , Disp: , Rfl:     pantoprazole (PROTONIX) 40 MG tablet, Take 40 mg by mouth., Disp: , Rfl:     vitamins  A,C,E-zinc-copper (ICAPS AREDS) 14,320-226-200 unit-mg-unit Cap, Take by mouth. 2 capsules daily, Disp: , Rfl:     baclofen (LIORESAL) 10 MG tablet, Take 10 mg by mouth 4 (four) times daily. , Disp: , Rfl:     Vitals:    05/15/19 1310   BP: (!) 145/80   BP Location: Left arm   Patient Position: Sitting   BP Method: Medium (Automatic)   Pulse: 63   Weight: 62 kg (136 lb 11 oz)   Height: 5' 3" (1.6 m)       Physical Exam   Constitutional: She is oriented to person, place, and time. She appears well-developed and well-nourished.   HENT:   Head: Normocephalic and atraumatic.   Eyes: Pupils are equal, round, and reactive to light.   Neck: Normal range of motion. Neck supple.   Cardiovascular: Normal rate.   Pulmonary/Chest: Effort normal.   Musculoskeletal: Normal range of motion. She exhibits no edema.   Neurological: She is alert and oriented to person, place, and time. She has a normal Finger-Nose-Finger Test, a normal Heel to Shin Test, a normal Romberg Test and a normal Tandem Gait Test. Gait normal.   Reflex Scores:       Tricep reflexes are 1+ on the right side and 1+ on the left side.       Bicep reflexes are 1+ on the right side and 1+ on the left side.       Brachioradialis reflexes are 1+ on the right side and 1+ on the left side.       Patellar reflexes are 0 on the right side and 1+ on the left side.       Achilles reflexes are " 1+ on the right side and 1+ on the left side.  Skin: Skin is warm, dry and intact.   Psychiatric: She has a normal mood and affect. Her speech is normal and behavior is normal. Judgment and thought content normal.   Nursing note and vitals reviewed.      Neurologic Exam     Mental Status   Oriented to person, place, and time.   Oriented to person.   Oriented to place.   Oriented to time.   Follows 3 step commands.   Attention: normal. Concentration: normal.   Speech: speech is normal   Level of consciousness: alert  Knowledge: consistent with education.   Able to name object. Able to read. Able to repeat. Able to write. Normal comprehension.     Cranial Nerves     CN II   Visual acuity: normal  Right visual field deficit: none  Left visual field deficit: none     CN III, IV, VI   Pupils are equal, round, and reactive to light.  Right pupil: Size: 3 mm. Shape: regular. Reactivity: brisk. Consensual response: intact.   Left pupil: Size: 3 mm. Shape: regular. Reactivity: brisk. Consensual response: intact.   CN III: no CN III palsy  CN VI: no CN VI palsy  Nystagmus: none   Diplopia: none  Ophthalmoparesis: none  Conjugate gaze: present    CN V   Right facial sensation deficit: none  Left facial sensation deficit: none    CN VII   Right facial weakness: none  Left facial weakness: none    CN VIII   Hearing: intact    CN IX, X   CN IX normal.   CN X normal.     CN XI   Right sternocleidomastoid strength: normal  Left sternocleidomastoid strength: normal  Right trapezius strength: normal  Left trapezius strength: normal    CN XII   Fasciculations: absent  Tongue deviation: none    Motor Exam   Muscle bulk: normal  Overall muscle tone: normal  Right arm pronator drift: absent  Left arm pronator drift: absent    Strength   Right neck flexion: 5/5  Left neck flexion: 5/5  Right neck extension: 5/5  Left neck extension: 5/5  Right deltoid: 5/5  Left deltoid: 5/5  Right biceps: 5/5  Left biceps: 5/5  Right triceps: 5/5  Left  triceps: 5/5  Right wrist flexion: 5/5  Left wrist flexion: 5/5  Right wrist extension: 5/5  Left wrist extension: 5/5  Right interossei: 5/5  Left interossei: 5/5  Right abdominals: 5/5  Left abdominals: 5/5  Right iliopsoas: 5/5  Left iliopsoas: 5/5  Right quadriceps: 5/5  Left quadriceps: 5/5  Right hamstrin/5  Left hamstrin/5  Right glutei: 55  Left glutei: 55  Right anterior tibial: 5/5  Left anterior tibial: 55  Right posterior tibial: 5/5  Left posterior tibial: 5  Right peroneal: 5  Left peroneal:   Right gastroc:   Left gastroc:     Sensory Exam   Right arm light touch: normal  Left arm light touch: normal  Right leg light touch: normal  Left leg light touch: normal  Right arm vibration: normal  Left arm vibration: normal  Right arm pinprick: normal  Left arm pinprick: normal    Gait, Coordination, and Reflexes     Gait  Gait: normal    Coordination   Romberg: negative  Finger to nose coordination: normal  Heel to shin coordination: normal  Tandem walking coordination: normal    Tremor   Resting tremor: absent  Intention tremor: absent  Action tremor: absent    Reflexes   Right brachioradialis: 1+  Left brachioradialis: 1+  Right biceps: 1+  Left biceps: 1+  Right triceps: 1+  Left triceps: 1+  Right patellar: 0  Left patellar: 1+  Right achilles: 1+  Left achilles: 1+  Right : 1+  Left : 1+  Right Heck: absent  Left Heck: absent  Right ankle clonus: absent  Left ankle clonus: absent      Provider dictation:  68 year old female with chronic pain, hyperlipidemia, hypertension, neuropathy, posterior C4/5 surgery in the  and lumbar surgery in  for right leg pain is referred by Dr. Katie Reilly for evaluation of neck and back pain.  She has had pain in the posterior and lateral neck for 8 years.  It is associated with right arm pain at times.  She also feels paresthesias in the right side of her face that are relieved with stretching neck to the left side.   Since 2016 she has felt hugh in the left side of the back from the thoracic to the lumbar region and radiating to the left lateral leg to the ankle.  For the last 1-2 months she has also felt pain in the right lower back radiating to the right posterior thigh.  She has tried gabapentin, nortryptyline, norco, tylenol and baclofen for pain control.  She has had massage therapy, PT in 2016 and continues with home exercises, and [facility] for the cervical (2016) and lumbar (2017) spine.  NDI:  24%Oswestry score: 31%.  PHQ:  0.    On exam, she has 5/5 strength and no sensory deficits in the upper/ lower extremities.  She has 1+ muscle stretch reflexes thorughout, except for the right patella whish is not detectable.    I have reviewed an MRI cervical spine from 2019 demonstrating extensive multi level degenerative changes with mild to moderate central canal stenosis from C2/3 to C6/7.  Findings are most prominent at C5/6.  There is bilateral foraminal narrowing at each level.    I have reviewed an MRI lumbar spine demonstrating scoliosis, degenerative changes with foraminal narrowing at every level.  Endplate inflammatory changes are noted at L1/2 and L4/5.    Ms. Cohen has chronic neck and back pain with pain in the extremities, but no specific neurological pattern.  We did discuss surgical intervention for both the cervical and lumbar spine which would require extensive surgical intervention for either area.  She is not interested in surgery at this time, and she can hold as she has no myelopathic fingings.  I then recommend she continue to work with PT and pain management to improve pain.  Follow up with me as needed.    Visit Diagnosis:  Neck pain    Osteoarthritis of spine with radiculopathy, cervical region    Scoliosis, unspecified scoliosis type, unspecified spinal region    Chronic bilateral low back pain with bilateral sciatica    Lumbar spondylosis        Total time spent counseling greater than fifty percent of  total visit time.  Counseling included discussion regarding imaging findings, diagnosis possibilities, treatment options, risks and benefits.   The patient had many questions regarding the options and long-term effects.

## 2019-11-11 DIAGNOSIS — G50.9 IDIOPATHIC TRIGEMINAL NEUROPATHY: ICD-10-CM

## 2019-11-13 RX ORDER — GABAPENTIN 300 MG/1
CAPSULE ORAL
Qty: 270 CAPSULE | Refills: 0 | Status: SHIPPED | OUTPATIENT
Start: 2019-11-13 | End: 2020-02-09

## 2020-02-09 DIAGNOSIS — G50.9 IDIOPATHIC TRIGEMINAL NEUROPATHY: ICD-10-CM

## 2020-02-09 RX ORDER — GABAPENTIN 300 MG/1
CAPSULE ORAL
Qty: 270 CAPSULE | Refills: 0 | Status: SHIPPED | OUTPATIENT
Start: 2020-02-09 | End: 2020-05-06

## 2020-02-09 NOTE — TELEPHONE ENCOUNTER
Her Gabapentin has been renewed for 90 days.  She should either follow up with neurology (would be due in April) for further refills or contact her pain management physician for further refills.

## 2020-05-05 DIAGNOSIS — G50.9 IDIOPATHIC TRIGEMINAL NEUROPATHY: ICD-10-CM

## 2020-05-06 RX ORDER — GABAPENTIN 300 MG/1
CAPSULE ORAL
Qty: 270 CAPSULE | Refills: 0 | Status: SHIPPED | OUTPATIENT
Start: 2020-05-06

## 2020-05-06 NOTE — TELEPHONE ENCOUNTER
Patient not seen in over a year. Needs to have a face to face with a prescribing physician for any further refills after this.

## 2020-08-07 DIAGNOSIS — G50.9 IDIOPATHIC TRIGEMINAL NEUROPATHY: ICD-10-CM

## 2020-08-07 RX ORDER — GABAPENTIN 300 MG/1
CAPSULE ORAL
Qty: 270 CAPSULE | Refills: 0 | OUTPATIENT
Start: 2020-08-07

## 2020-08-07 NOTE — TELEPHONE ENCOUNTER
Called patient to discuss this issue, no answer left detailed message to call us back to get her schedule for an appt to see Dr. Reilly and also let her know that the refill request was denied until an appt is done.

## 2020-08-07 NOTE — TELEPHONE ENCOUNTER
Medication request denied.  Patient has not been see in this clinic since April 2019.  She may get refills from her primary physician or whomever is currently managing her spinal issues.  She is welcome to return to neurology clinic if she has further neurology needs.

## 2024-03-10 NOTE — TELEPHONE ENCOUNTER
Returned pt call and instructed pt to sign [facility] release of information and return to us so that we can obtain her records; notified her that they would not release any of her info to us on the [id number] form; verbalized understanding.    yes